# Patient Record
Sex: MALE | Race: WHITE
[De-identification: names, ages, dates, MRNs, and addresses within clinical notes are randomized per-mention and may not be internally consistent; named-entity substitution may affect disease eponyms.]

---

## 2020-03-08 ENCOUNTER — HOSPITAL ENCOUNTER (EMERGENCY)
Dept: HOSPITAL 76 - ED | Age: 40
Discharge: HOME | End: 2020-03-08
Payer: MEDICAID

## 2020-03-08 VITALS — SYSTOLIC BLOOD PRESSURE: 120 MMHG | DIASTOLIC BLOOD PRESSURE: 93 MMHG
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
## 2020-03-08 DIAGNOSIS — Z23: ICD-10-CM

## 2020-03-08 DIAGNOSIS — S01.81XA: Primary | ICD-10-CM
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
## 2020-03-08 PROCEDURE — 90471 IMMUNIZATION ADMIN: CPT

## 2020-03-08 PROCEDURE — 99283 EMERGENCY DEPT VISIT LOW MDM: CPT

## 2020-03-08 PROCEDURE — 99284 EMERGENCY DEPT VISIT MOD MDM: CPT

## 2020-03-08 PROCEDURE — 12013 RPR F/E/E/N/L/M 2.6-5.0 CM: CPT

## 2020-03-08 NOTE — ED PHYSICIAN DOCUMENTATION
PD HPI HEAD INJURY





- Stated complaint


Stated Complaint: FACE LAC





- History obtained from


History obtained from: Patient (39-year-old gentleman with unsure tetanus status

was chopping wood today and a piece of wood came up and impacted his left 

forehead where he has a laceration.  No loss of consciousness or headache.)





Review of Systems


Constitutional: reports: Reviewed and negative


Eyes: denies: Loss of vision, Decreased vision, Photophobia, Discharge, 

Irritation


Nose: reports: Reviewed and negative





PD PAST MEDICAL HISTORY





- Past Medical History


Past Medical History: No





- Present Medications


Home Medications: 


                                Ambulatory Orders











 Medication  Instructions  Recorded  Confirmed


 


No Known Home Medications  03/08/20 03/08/20














- Allergies


Allergies/Adverse Reactions: 


                                    Allergies











Allergy/AdvReac Type Severity Reaction Status Date / Time


 


No Known Drug Allergies Allergy   Verified 03/08/20 13:21














- Immunizations


Immunizations: TDAP >10years/unknown





PD ED PE NORMAL





- Vitals


Vital signs reviewed: Yes





- General


General: Alert and oriented X 3, No acute distress





- HEENT


HEENT: PERRL, EOMI, Other (There is about a 3 cm laceration, vertical on the 

left side of the forehead)





- Neck


Neck: Supple, no meningeal sign, No bony TTP





- Neuro


Neuro: Alert and oriented X 3, CNs 2-12 intact, No motor deficit, No sensory 

deficit, Normal speech





Results





- Vitals


Vitals: 


                               Vital Signs - 24 hr











  03/08/20





  13:16


 


Temperature 37 C


 


Heart Rate 96


 


Respiratory 22





Rate 


 


Blood Pressure 120/93 H


 


O2 Saturation 96








                                     Oxygen











O2 Source                      Room air

















Procedures





- Laceration (location)


  ** face/forehead


Length in cm: 3


Wound type: Linear


Neurovascular status: Sensory intact, Motor intact, Vascular intact


Anesthesia: Lidocaine 1%, With bicarb


Wound Preparation: Hibiclens, Irrigated copiously NS, Wound explored.  No: FB 

identified


Skin layer closure: Prolene, Interrupted, Size #-0 - enter number (6-0), Sutures

- enter # (9)


Other: Tetanus booster given


Complexity: Simple





Departure





- Departure


Disposition: 01 Home, Self Care


Clinical Impression: 


Facial laceration


Qualifiers:


 Encounter type: initial encounter Qualified Code(s): S01.81XA - Laceration 

without foreign body of other part of head, initial encounter





Instructions:  ED Laceration Facial Sutr Tape


Comments: 


Come back for any signs of infection which would include: Redness, swelling, 

drainage, increased pain, or fevers.


You can wash it soap and water. Keep it covered and moist with bacitracin 

ointment which is available over the counter; avoid neosporin.


Follow-up with your physician in 6-7 days for suture removal.

## 2021-06-27 ENCOUNTER — HOSPITAL ENCOUNTER (EMERGENCY)
Dept: HOSPITAL 76 - ED | Age: 41
Discharge: HOME | End: 2021-06-27
Payer: MEDICAID

## 2021-06-27 VITALS — DIASTOLIC BLOOD PRESSURE: 71 MMHG | SYSTOLIC BLOOD PRESSURE: 109 MMHG

## 2021-06-27 DIAGNOSIS — S61.211A: Primary | ICD-10-CM
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
## 2021-06-27 PROCEDURE — 99282 EMERGENCY DEPT VISIT SF MDM: CPT

## 2021-06-27 PROCEDURE — 12002 RPR S/N/AX/GEN/TRNK2.6-7.5CM: CPT

## 2021-06-27 NOTE — ED PHYSICIAN DOCUMENTATION
PD HPI UPPER EXT INJURY





- Stated complaint


Stated Complaint: LT FINGER LACS





- Chief complaint


Chief Complaint: Laceration





- History obtained from


History obtained from: Patient





- History of Present Illness


Location: Left, Finger (index finger lac, and states the board he was cutting 

kicked and struck other fingers which are tender but good ROM. He is not 

concerned about fractures.)


Where injury occurred: Home


Timing - onset: Today


Timing - details: Abrupt onset, Still present


Worsened by: Moving, Palpating


Associated symptoms: No: Weakness, Numbness, Tingling


Similar symptoms before: Has not had sx before





Review of Systems


Constitutional: denies: Fever, Chills


Nose: denies: Rhinorrhea / runny nose, Congestion


Throat: denies: Sore throat


Respiratory: denies: Cough


Neurologic: denies: Focal weakness, Numbness





PD PAST MEDICAL HISTORY





- Past Medical History


Cardiovascular: None


Respiratory: None


Neuro: None


Endocrine/Autoimmune: None





- Present Medications


Home Medications: 


                                Ambulatory Orders











 Medication  Instructions  Recorded  Confirmed


 


No Known Home Medications  03/08/20 03/08/20














- Allergies


Allergies/Adverse Reactions: 


                                    Allergies











Allergy/AdvReac Type Severity Reaction Status Date / Time


 


No Known Drug Allergies Allergy   Verified 06/27/21 17:52














- Immunizations


Immunizations: TDAP >10years/unknown





PD ED PE NORMAL





- Vitals


Vital signs reviewed: Yes





- General


General: Alert and oriented X 3, No acute distress, Well developed/nourished





- Derm


Derm: Normal color, Warm and dry





- Extremities


Extremities: Other (Left index finger at the palmar PIP joint with 1.5 cm 

laceration down to the fatty tissue.  No deep structures involved.  Good flexion

and sensation in the finger.  No foreign bodies.  Mild bleeding.)





- Neuro


Neuro: Alert and oriented X 3, No motor deficit, No sensory deficit, Normal spe

ech, Other (The other fingers show mild tenderness without any lacerations.  No 

bony tenderness or deformity and good range of motion.  He does not have 

suspicion for fractures.)





Results





- Vitals


Vitals: 


                               Vital Signs - 24 hr











  06/27/21 06/27/21





  17:48 19:30


 


Temperature 36.7 C 36.6 C


 


Heart Rate 102 H 98


 


Respiratory 15 16





Rate  


 


Blood Pressure 136/78 H 109/71


 


O2 Saturation 99 98








                                     Oxygen











O2 Source                      Room air

















Procedures





- Laceration (location)


  ** left index finger


Length in cm: 3


Wound type: Curved


Skin layer closure: Nylon, Interrupted, Size #-0 - enter number (4), Sutures - 

enter # (8)


Other: Patient tolerated well, No complications, Neurovascular intact, Dressing 

applied, Tetanus UTD





Departure





- Departure


Disposition: 01 Home, Self Care


Clinical Impression: 


Finger laceration


Qualifiers:


 Encounter type: initial encounter Finger: index finger Damage to nail status: 

without damage Foreign body presence: without foreign body Laterality: left 

Qualified Code(s): S61.211A - Laceration without foreign body of left index 

finger without damage to nail, initial encounter





Condition: Stable


Record reviewed to determine appropriate education?: Yes


Instructions:  ED Laceration Hand


Comments: 


It is okay to wash and shower. Clean off the wound twice a day with soap and 

water, or peroxide and water. Apply some antibiotic ointment to it to keep it 

moist. Also to watch for signs of infection such as purulence, redness or 

increasing pain. Return to your primary care or the ER at the specified time for

suture removal..





Suture removal 9 to 10 days.





Ibuprofen 3 times a day as needed for pain.





Activity as tolerated with the hand.


Discharge Date/Time: 06/27/21 19:31

## 2021-07-03 ENCOUNTER — HOSPITAL ENCOUNTER (EMERGENCY)
Dept: HOSPITAL 76 - ED | Age: 41
Discharge: HOME | End: 2021-07-03
Payer: MEDICAID

## 2021-07-03 VITALS — SYSTOLIC BLOOD PRESSURE: 117 MMHG | DIASTOLIC BLOOD PRESSURE: 78 MMHG

## 2021-07-03 DIAGNOSIS — F17.200: ICD-10-CM

## 2021-07-03 DIAGNOSIS — L03.115: Primary | ICD-10-CM

## 2021-07-03 PROCEDURE — 99282 EMERGENCY DEPT VISIT SF MDM: CPT

## 2021-07-03 PROCEDURE — 99283 EMERGENCY DEPT VISIT LOW MDM: CPT

## 2021-07-03 NOTE — ED PHYSICIAN DOCUMENTATION
History of Present Illness





- Stated complaint


Stated Complaint: LT ANKLE INSECT BITE





- Chief complaint


Chief Complaint: Wound





- Additonal information


Additional information: 


40-year-old male presents the emergency department for evaluation of left lower 

leg erythema swelling and tenderness.  He was working near a wood pile 3 days 

ago and got an insect bite.  He expected a little redness around the insect bite

but since then he has had some redness that it has extended down to the dorsum 

of the foot and up the anterior shin not consistent with simple inflammation.  

He reports that his become increasingly painful though no drainage.  No fevers 

no history of similar in the past.








Review of Systems


Constitutional: reports: Reviewed and negative


Nose: reports: Reviewed and negative


Throat: reports: Reviewed and negative


Cardiac: reports: Reviewed and negative


Respiratory: reports: Reviewed and negative


GI: reports: Reviewed and negative


: reports: Reviewed and negative


Skin: reports: Lesions


Musculoskeletal: reports: Reviewed and negative





PD PAST MEDICAL HISTORY





- Past Medical History


Cardiovascular: None


Respiratory: None


Neuro: None


Endocrine/Autoimmune: None





- Past Surgical History


Past Surgical History: No





- Present Medications


Home Medications: 


                                Ambulatory Orders











 Medication  Instructions  Recorded  Confirmed


 


cephALEXin [Keflex] 500 mg PO Q6H #28 cap 07/03/21 














- Allergies


Allergies/Adverse Reactions: 


                                    Allergies











Allergy/AdvReac Type Severity Reaction Status Date / Time


 


No Known Drug Allergies Allergy   Verified 07/03/21 19:03














- Social History


Does the pt smoke?: Yes


Smoking Status: Current every day smoker


Does the pt drink ETOH?: Yes


Does the pt have substance abuse?: No





- Immunizations


Immunizations: TDAP >10years/unknown





PD ED PE EXPANDED





- General


General: Alert, No acute distress





- Cardiac


Cardiac: Regular Rate, Radial strong equal, Cap refill < 2 sec





- Respiratory


Respiratory: Clear to ausultation jarrod.  No: Distress, Labored





- Extremities


Extremities: Left leg (Bug bite noted at the lower leg and foot margin.  However

 there is extensive erythema to the dorsum of the foot as well as extending up 

to the anterior shin with some induration.  Very tender to the touch.  2+ DP 

pulse.  Normal gait.)





Results





- Vitals


Vitals: 





                               Vital Signs - 24 hr











  07/03/21





  18:59


 


Temperature 36.4 C L


 


Heart Rate 101 H


 


Respiratory 16





Rate 


 


Blood Pressure 117/78


 


O2 Saturation 98








                                     Oxygen











O2 Source                      Room air

















PD MEDICAL DECISION MAKING





- ED course


Complexity details: d/w patient


ED course: 





40-year-old male presents emergency department with left lower foot swelling and

 erythema after a bug bite.  He did have some initial erythema that would be 

consistent with inflammation but the extension of the redness up the calf and to

 the dorsum of the foot is more consistent with a subacute cellulitis.  He has 

not had any fevers or red streaking.  First dose of cephalexin given here in the

 emergency department will write a prescription for a 7-day course.  Recommend 

bacitracin on the bug bite wound.  Emergent return precautions discussed.





Impression 





right foot and lower leg cellulitis.





Departure





- Departure


Disposition: 01 Home, Self Care


Condition: Stable


Record reviewed to determine appropriate education?: Yes


Instructions:  ED Infec Skin Cellulitis


Prescriptions: 


cephALEXin [Keflex] 500 mg PO Q6H #28 cap


Comments: 


Cephalexin and begin toDerek the left foot and lower leg look as though you have

 developed a bacterial skin infection called cellulitis.  Your first dose of 

antibiotic was given today in the emergency department.  Please fill the 

prescription again 4 times daily for the next 7 days.  I recommend a warm 

compress on this as well as antibiotic ointment over the bite wound.





Return to the ER if you are having worsening symptoms despite the antibiotics.

## 2021-07-07 ENCOUNTER — HOSPITAL ENCOUNTER (EMERGENCY)
Dept: HOSPITAL 76 - ED | Age: 41
Discharge: HOME | End: 2021-07-07
Payer: MEDICAID

## 2021-07-07 VITALS — SYSTOLIC BLOOD PRESSURE: 125 MMHG | DIASTOLIC BLOOD PRESSURE: 76 MMHG

## 2021-07-07 DIAGNOSIS — F17.200: ICD-10-CM

## 2021-07-07 DIAGNOSIS — L02.416: Primary | ICD-10-CM

## 2021-07-07 PROCEDURE — 87205 SMEAR GRAM STAIN: CPT

## 2021-07-07 PROCEDURE — 87070 CULTURE OTHR SPECIMN AEROBIC: CPT

## 2021-07-07 PROCEDURE — 99283 EMERGENCY DEPT VISIT LOW MDM: CPT

## 2021-07-07 PROCEDURE — 87181 SC STD AGAR DILUTION PER AGT: CPT

## 2021-07-07 PROCEDURE — 10060 I&D ABSCESS SIMPLE/SINGLE: CPT

## 2021-07-07 NOTE — ED PHYSICIAN DOCUMENTATION
PD HPI LOWER EXT INJURY





- Stated complaint


Stated Complaint: LT FOOT PX/SWELLING





- Chief complaint


Chief Complaint: Ext Problem





- History obtained from


History obtained from: Patient





- History of Present Illness


PD HPI LOW EXT INJURY LOCATION: Left, Lower leg


Type of injury: Other (had apparent bug bite but not sure, with redness and 

swelling of the area. Seen in ER and Rx Keflex. He states has not improved, and 

has developed into local swelling area, with faint amount of drainage on 

dressing, but still swelling more today.)


Timing - onset: How many days ago (5-6)


Timing - duration: Days (onset of some redness and tender 5-6 days ago with 

expanding redness. Seen in ER and Dx with cellulitis. Rx Keflex.)


Timing - details: Gradual onset


Worsened by: Palpating


Associated symptoms: Swelling, Discolored.  No: Weakness, Numbness


Similar symptoms before: Has not had sx before


Recently seen: Emergency Dept





Review of Systems


Constitutional: denies: Fever, Chills


Skin: reports: Lesions


Neurologic: denies: Focal weakness, Numbness





PD PAST MEDICAL HISTORY





- Past Medical History


Cardiovascular: None


Respiratory: None


Neuro: None


Endocrine/Autoimmune: None





- Past Surgical History


Past Surgical History: No





- Present Medications


Home Medications: 


                                Ambulatory Orders











 Medication  Instructions  Recorded  Confirmed


 


cephALEXin [Keflex] 500 mg PO Q6H #28 cap 07/03/21 07/07/21


 


Doxycycline Hyclate 100 mg PO BID #14 07/07/21 


 


HYDROcod/ACETAM 5/325 [[place] 5/325] 1 ea PO Q6H PRN #10 tablet 07/07/21 














- Allergies


Allergies/Adverse Reactions: 


                                    Allergies











Allergy/AdvReac Type Severity Reaction Status Date / Time


 


No Known Drug Allergies Allergy   Verified 07/03/21 19:03














- Social History


Does the pt smoke?: Yes


Smoking Status: Current every day smoker


Does the pt drink ETOH?: Yes


Does the pt have substance abuse?: No





- Immunizations


Immunizations: TDAP >10years/unknown





PD ED PE NORMAL





- Vitals


Vital signs reviewed: Yes





- General


General: Alert and oriented X 3, Well developed/nourished





- Derm


Derm: Normal color, Warm and dry





- Extremities


Extremities: Other (left anterior lower leg above the ankle, with local 2 cm 

area of focal swelling and fluctuance, tender, with apparent thin skin roof of 

it, blanched color. Some dark purple surrounding this abscess. Redness with 

tender extends around the abscess and onto the dorsum of the foot. )





- Neuro


Neuro: Alert and oriented X 3, No motor deficit, No sensory deficit





Results





- Vitals


Vitals: 


                               Vital Signs - 24 hr











  07/07/21 07/07/21





  22:17 22:50


 


Temperature 37.0 C 


 


Heart Rate 88 


 


Respiratory 16 16





Rate  


 


Blood Pressure 125/76 


 


O2 Saturation 100 








                                     Oxygen











O2 Source                      Room air

















Procedures





- Abscess I&D (location)


  ** anterior lower leg


Preparation: LET (abscess seemed to have thin roof, so LET used with reasonable 

effect.)


Incision: Incised with scalpel, Purulent drainage, Loculations broken, 

Irrigated, Culture obtained.  No: Packed


Other: Pt tolerated well, Dressing applied, Antibiotic prescribed (was on 

Keflex, but seems likely staph and not improving, so added Doxy pending 

culture.)





PD MEDICAL DECISION MAKING





- ED course


Complexity details: reviewed old records (prior visit 4 days ago. ), considered 

differential (lower leg abscess with cellulitis of dorsum foot. ), d/w patient





Departure





- Departure


Disposition: 01 Home, Self Care


Clinical Impression: 


 Abscess of lower leg





Condition: Stable


Record reviewed to determine appropriate education?: Yes


Instructions:  ED Abscess IandD


Prescriptions: 


Doxycycline Hyclate 100 mg PO BID #14


HYDROcod/ACETAM 5/325 [[place] 5/325] 1 ea PO Q6H PRN #10 tablet


 PRN Reason: Pain


Comments: 


With the abscess draining now, the infection should decrease fairly readily over

the next couple of days.  Add doxycycline to the cephalexin to provide better 

staph coverage.  We will get a culture result back in a couple of days and that 

can help narrow the antibiotic choice we may be able to eliminate one of them.





I would anticipate ready improvement in the next couple of days.  Recheck if not

well improved in that timeframe.





Soak the area in warm water tonight and 2 or 3 times daily over the next few 

days to promote drainage from the incision and provide good blood flow to the 

area to help fight infection.





Tylenol or ibuprofen if needed for pains.  Add hydrocodone if needed for worse 

pain.


Discharge Date/Time: 07/07/21 23:55

## 2022-12-27 ENCOUNTER — HOSPITAL ENCOUNTER (OUTPATIENT)
Dept: HOSPITAL 76 - EMS | Age: 42
End: 2022-12-27
Payer: MEDICAID